# Patient Record
Sex: FEMALE | Race: WHITE | NOT HISPANIC OR LATINO | URBAN - METROPOLITAN AREA
[De-identification: names, ages, dates, MRNs, and addresses within clinical notes are randomized per-mention and may not be internally consistent; named-entity substitution may affect disease eponyms.]

---

## 2019-12-08 ENCOUNTER — OFFICE VISIT - RIVER FALLS (OUTPATIENT)
Dept: FAMILY MEDICINE | Facility: CLINIC | Age: 49
End: 2019-12-08

## 2019-12-08 ASSESSMENT — MIFFLIN-ST. JEOR: SCORE: 1102.57

## 2022-02-11 VITALS
BODY MASS INDEX: 22.08 KG/M2 | TEMPERATURE: 97.9 F | WEIGHT: 120 LBS | OXYGEN SATURATION: 98 % | RESPIRATION RATE: 18 BRPM | SYSTOLIC BLOOD PRESSURE: 142 MMHG | DIASTOLIC BLOOD PRESSURE: 84 MMHG | HEART RATE: 92 BPM | HEIGHT: 62 IN

## 2022-02-16 NOTE — NURSING NOTE
Comprehensive Intake Entered On:  12/8/2019 9:14 AM CST    Performed On:  12/8/2019 9:02 AM CST by Faiza Schmidt LPN               Summary   Chief Complaint :   lower back pain started about a week ago. Pain is better when standing. Has been taking Asprin and Ibuprofen with no improvement. Exposed to Bleach in car for many hours.    Weight Measured :   120 lb(Converted to: 120 lb 0 oz, 54.43 kg)    Height Measured :   62 in(Converted to: 5 ft 2 in, 157.48 cm)    Body Mass Index :   21.95 kg/m2   Body Surface Area :   1.54 m2   Systolic Blood Pressure :   142 mmHg (HI)    Diastolic Blood Pressure :   84 mmHg (HI)    Mean Arterial Pressure :   103 mmHg   Peripheral Pulse Rate :   92 bpm   BP Site :   Right arm   Pulse Site :   Radial artery   BP Method :   Manual   HR Method :   Manual   Temperature Tympanic :   97.9 DegF(Converted to: 36.6 DegC)    Respiratory Rate :   18 br/min   Oxygen Saturation :   98 %   Faiza Schmidt LPN - 12/8/2019 9:02 AM CST   Health Status   Allergies Verified? :   Yes   Medication History Verified? :   Yes   Tobacco Use? :   Current every day smoker   Tobacco Cessation Review :   Not ready to quit, Advised to quit   Faiza Schmidt LPN - 12/8/2019 9:02 AM CST   Consents   Consent for Immunization Exchange :   Consent Granted   Consent for Immunizations to Providers :   Consent Granted   Faiza Schmidt LPN - 12/8/2019 9:02 AM CST   Meds / Allergies   (As Of: 12/8/2019 9:14:30 AM CST)   Allergies (Active)   Bactrim  Estimated Onset Date:   Unspecified ; Reactions:   Hives ; Created By:   Faiza Schmidt LPN; Reaction Status:   Active ; Category:   Drug ; Substance:   Bactrim ; Type:   Allergy ; Severity:   Medium ; Updated By:   Faiza Schmidt LPN; Source:   Patient ; Reviewed Date:   12/8/2019 9:12 AM CST        Medication List   (As Of: 12/8/2019 9:14:30 AM CST)   Home Meds    propranolol  :   propranolol ; Status:   Documented ; Ordered As Mnemonic:   propranolol  10 mg oral tablet ; Simple Display Line:   10 mg, 1 tab(s), Oral, bid, 0 Refill(s) ; Catalog Code:   propranolol ; Order Dt/Tm:   12/8/2019 9:11:32 AM CST          venlafaxine  :   venlafaxine ; Status:   Documented ; Ordered As Mnemonic:   Effexor  mg oral capsule, extended release ; Simple Display Line:   150 mg, 1 cap(s), Oral, daily, 30 cap(s), 0 Refill(s) ; Catalog Code:   venlafaxine ; Order Dt/Tm:   12/8/2019 9:11:07 AM CST          levothyroxine  :   levothyroxine ; Status:   Documented ; Ordered As Mnemonic:   levothyroxine 125 mcg (0.125 mg) oral tablet ; Simple Display Line:   125 mcg, 1 tab(s), Oral, daily, 0 Refill(s) ; Catalog Code:   levothyroxine ; Order Dt/Tm:   12/8/2019 9:10:35 AM CST

## 2022-02-16 NOTE — PROGRESS NOTES
Chief Complaint    lower back pain started about a week ago. Pain is better when standing. Has been taking Asprin and Ibuprofen with no improvement. Exposed to Bleach in car for many hours.  History of Present Illness      Back pain started a week ago spontaneously. Woke up one morning with an ache and gradually increased. Denies any trauma or trigger. Seemed to be getting better yesterday and then increased this morning.       Drove from [place] back home and pain started the next day.      Better with standing up. Certain movements trigger the pain. No pain down the legs.      Bottle of bleach spilled in car while driving home. Became nauseated and vomited with a headache. Rolled down windows. Breathing now feels fine.  Review of Systems      No weakness      No incontinence      No numbness or tingling in legs  Physical Exam   Vitals & Measurements    T: 97.9   F (Tympanic)  HR: 92(Peripheral)  RR: 18  BP: 142/84  SpO2: 98%     HT: 62 in  WT: 120 lb  BMI: 21.95       General: No acute distress but does have significant pain with certain movements      Musculoskeletal: Ambulatory but prefers to stand.  Pain with certain movements.  Tender to palpation in specific location left paraspinal muscles and lumbar area with movement.  Good strength and range of motion legs.      Neurologic: Deep tendon reflexes symmetric.  Straight leg raise is negative      Lungs: Clear      Heart: Regular rate and rhythm      No alarm symptoms do not feel x-ray imaging indicated.      Toradol 60 mg IM given with some improvement  Assessment/Plan      Musculoskeletal back pain: Discussed natural history handouts given.  Will treat with ibuprofen 800 mg 3 times daily and Tylenol 1000mg 3 times daily.  Follow-up if not improving      Bleach exposure: No significant respiratory symptoms.  Do not feel any long-term sequela, however continue to monitor symptoms and follow-up if not improving.  Patient Information     Name:ELIN JOSEPH       Address:      [address] [place] [phone]     Sex:Female     YOB: 1970     [phone]     Emergency Contact:[facility], CONTACT     MRN:[id number]     FIN:[id number]     Location:[facility]     Date of Service:12/08/2019      Primary Care Physician:       NONE ,       Attending Physician:       Parviz Ocampo MD, [phone]  Problem List/Past Medical History    Ongoing     No qualifying data    Historical     No qualifying data  Medications    Effexor  mg oral capsule, extended release, 150 mg= 1 cap(s), Oral, daily    levothyroxine 125 mcg (0.125 mg) oral tablet, 125 mcg= 1 tab(s), Oral, daily    propranolol 10 mg oral tablet, 10 mg= 1 tab(s), Oral, bid  Allergies    Bactrim (Hives)  Social History    Smoking Status - 12/08/2019     Current every day smoker